# Patient Record
Sex: FEMALE | Race: WHITE | NOT HISPANIC OR LATINO | URBAN - METROPOLITAN AREA
[De-identification: names, ages, dates, MRNs, and addresses within clinical notes are randomized per-mention and may not be internally consistent; named-entity substitution may affect disease eponyms.]

---

## 2019-06-07 PROBLEM — 235595009 GASTROESOPHAGEAL REFLUX DISEASE: Status: ACTIVE | Noted: 2019-06-07

## 2019-06-07 PROBLEM — 38341003 HYPERTENSION: Status: ACTIVE | Noted: 2019-06-07

## 2020-06-23 ENCOUNTER — OFFICE VISIT (OUTPATIENT)
Dept: URBAN - METROPOLITAN AREA TELEHEALTH 2 | Facility: TELEHEALTH | Age: 75
End: 2020-06-23
Payer: MEDICARE

## 2020-06-23 DIAGNOSIS — Z12.11 COLON CANCER SCREENING: ICD-10-CM

## 2020-06-23 DIAGNOSIS — K21.9 GERD (GASTROESOPHAGEAL REFLUX DISEASE): ICD-10-CM

## 2020-06-23 PROCEDURE — 1036F TOBACCO NON-USER: CPT | Performed by: NURSE PRACTITIONER

## 2020-06-23 PROCEDURE — 3017F COLORECTAL CA SCREEN DOC REV: CPT | Performed by: NURSE PRACTITIONER

## 2020-06-23 PROCEDURE — G9903 PT SCRN TBCO ID AS NON USER: HCPCS | Performed by: NURSE PRACTITIONER

## 2020-06-23 PROCEDURE — G8427 DOCREV CUR MEDS BY ELIG CLIN: HCPCS | Performed by: NURSE PRACTITIONER

## 2020-06-23 PROCEDURE — 99213 OFFICE O/P EST LOW 20 MIN: CPT | Performed by: NURSE PRACTITIONER

## 2020-06-23 RX ORDER — LOSARTAN POTASSIUM 50 MG/1
TAKE 1 TABLET (50 MG) BY ORAL ROUTE ONCE DAILY TABLET ORAL 1
Qty: 0 | Refills: 0 | Status: ACTIVE | COMMUNITY
Start: 1900-01-01

## 2020-06-23 RX ORDER — HYOSCYAMINE SULFATE 0.12 MG/1
PLACE 1 TABLET UNDER TONGUE BY TRANSLINGUAL ROUTE EVERY 4 TO 6 HOURS AS NEEDED FOR 30 DAYS SPASM/ABDOMINAL PAIN TABLET, ORALLY DISINTEGRATING ORAL
Qty: 60 | Refills: 11 | Status: ACTIVE | COMMUNITY
Start: 2020-02-25 | End: 2021-02-19

## 2020-06-23 RX ORDER — PANTOPRAZOLE SODIUM 40 MG
TAKE 1 TABLET (40 MG) BY ORAL ROUTE 2 TIMES PER DAY FOR 90 DAYS TABLET, DELAYED RELEASE (ENTERIC COATED) ORAL 2
Qty: 180 | Refills: 3 | Status: ACTIVE | COMMUNITY
Start: 2020-02-25 | End: 2021-02-19

## 2020-06-23 RX ORDER — OMEPRAZOLE 40 MG/1
1 CAPSULE 30 MINUTES BEFORE MORNING MEAL CAPSULE, DELAYED RELEASE ORAL ONCE A DAY
Qty: 90 | Refills: 3 | OUTPATIENT
Start: 2020-06-23

## 2020-06-23 RX ORDER — PANTOPRAZOLE SODIUM 40 MG
TAKE 1 TABLET (40 MG) BY ORAL ROUTE 2 TIMES PER DAY FOR 90 DAYS TABLET, DELAYED RELEASE (ENTERIC COATED) ORAL 2
OUTPATIENT
Start: 2020-02-25 | End: 2021-02-19

## 2020-06-23 NOTE — HPI-OTHER HISTORIES
7/2/2019 Ms. Kayce Moffett is a 74 year old female here for reflux. She has had reflux for years. It has gotten worse over the last 18 months. She thinks she may have had an ulcer at one time. She has never had an EGD. She has tried omerpazole with no change- she can not remember how long she took it. She had been taking zantac 150mg but she stopped this because she did not feel it helped. She is currently taking Mint Rolaids. These help the most. Her symptoms are worse when she lays down at night. She denies any blood in her stool or melena. She has had a loss of appetite. CS  9/24 Kayce returns after EGD that showed hiatal hernia and moderate esophagitis. She currently is on  only rolaids. she did not take my advice to be on PPI. She is still having gerd and nighttime heartburn. She is not pleased with her progresso on rolaids.  11/19/2019 Ms. Devries is here for f.u of esophagitis. She was started on protonix 40mg BID at her last OV. Today, she is feeling great. Her reflux is now well controlled. She has woke a few times with acid in her throat. She has been taking the protonix at bedtime not with a meal. CS   2/25/2020 Ms. Moffett is here for f/u of GERD. She was decreased from BID protonix to daily. She denies any breakthrough and her reflux she feels is well controlled. She is however having some chest pain and a rash. She is seeing cardiology and dermatology soon. She is very concerned about her symptoms. CS

## 2020-06-23 NOTE — HPI-TODAY'S VISIT:
Ms. Moffett is evaluated via telehealth for f/u of GERD. At her last OV, she was feeling better and was able to wean to daily protonix. She was having some chest pain and a rash and was seeing cardiology and derm. We were concerned the chest pain was coming from reflux and spasm. Her protonix was increased back to BID and levsin was added prn. She saw cardiology and it was felt her chest pain was reflux related. She saw derm and there was a concern that protonix was the cause. She started taking the protonix everyother day and the rash got slightly better. She stopped it for the last week and has noticed a big improvement in her rash. She is having some reflux- rolaids do help. She had a cologuard that was negative with her PCP recently. AGUSTÍN

## 2020-07-21 ENCOUNTER — OFFICE VISIT (OUTPATIENT)
Dept: URBAN - METROPOLITAN AREA TELEHEALTH 2 | Facility: TELEHEALTH | Age: 75
End: 2020-07-21
Payer: MEDICARE

## 2020-07-21 DIAGNOSIS — K21.9 GERD (GASTROESOPHAGEAL REFLUX DISEASE): ICD-10-CM

## 2020-07-21 DIAGNOSIS — Z12.11 COLON CANCER SCREENING: ICD-10-CM

## 2020-07-21 PROCEDURE — 99441 PHONE E/M BY PHYS 5-10 MIN: CPT | Performed by: NURSE PRACTITIONER

## 2020-07-21 PROCEDURE — 99442 PHONE E/M BY PHYS 11-20 MIN: CPT | Performed by: NURSE PRACTITIONER

## 2020-07-21 RX ORDER — OMEPRAZOLE 40 MG/1
1 CAPSULE 30 MINUTES BEFORE MORNING MEAL CAPSULE, DELAYED RELEASE ORAL ONCE A DAY
Qty: 90 | Refills: 3 | Status: ON HOLD | COMMUNITY
Start: 2020-06-23

## 2020-07-21 RX ORDER — HYOSCYAMINE SULFATE 0.12 MG/1
PLACE 1 TABLET UNDER TONGUE BY TRANSLINGUAL ROUTE EVERY 4 TO 6 HOURS AS NEEDED FOR 30 DAYS SPASM/ABDOMINAL PAIN TABLET, ORALLY DISINTEGRATING ORAL
Qty: 60 | Refills: 11 | Status: ACTIVE | COMMUNITY
Start: 2020-02-25 | End: 2021-02-19

## 2020-07-21 RX ORDER — FAMOTIDINE 40 MG/1
1 TABLET 30MINS PRIOR TO MEALS TABLET, FILM COATED ORAL BID
Qty: 180 TABLET | Refills: 3 | OUTPATIENT
Start: 2020-07-21

## 2020-07-21 RX ORDER — LOSARTAN POTASSIUM 50 MG/1
TAKE 1 TABLET (50 MG) BY ORAL ROUTE ONCE DAILY TABLET ORAL 1
Qty: 0 | Refills: 0 | Status: ACTIVE | COMMUNITY
Start: 1900-01-01

## 2020-07-21 NOTE — HPI-OTHER HISTORIES
7/2/2019 Ms. Kayce Moffett is a 74 year old female here for reflux. She has had reflux for years. It has gotten worse over the last 18 months. She thinks she may have had an ulcer at one time. She has never had an EGD. She has tried omerpazole with no change- she can not remember how long she took it. She had been taking zantac 150mg but she stopped this because she did not feel it helped. She is currently taking Mint Rolaids. These help the most. Her symptoms are worse when she lays down at night. She denies any blood in her stool or melena. She has had a loss of appetite. CS  9/24 Kayce returns after EGD that showed hiatal hernia and moderate esophagitis. She currently is on  only rolaids. she did not take my advice to be on PPI. She is still having gerd and nighttime heartburn. She is not pleased with her progresso on rolaids.  11/19/2019 Ms. Devries is here for f.u of esophagitis. She was started on protonix 40mg BID at her last OV. Today, she is feeling great. Her reflux is now well controlled. She has woke a few times with acid in her throat. She has been taking the protonix at bedtime not with a meal. CS   2/25/2020 Ms. Moffett is here for f/u of GERD. She was decreased from BID protonix to daily. She denies any breakthrough and her reflux she feels is well controlled. She is however having some chest pain and a rash. She is seeing cardiology and dermatology soon. She is very concerned about her symptoms. CS   6/23/2020 Ms. Moffett is evaluated via telehealth for f/u of GERD. At her last OV, she was feeling better and was able to wean to daily protonix. She was having some chest pain and a rash and was seeing cardiology and derm. We were concerned the chest pain was coming from reflux and spasm. Her protonix was increased back to BID and levsin was added prn. She saw cardiology and it was felt her chest pain was reflux related. She saw derm and there was a concern that protonix was the cause. She started taking the protonix everyother day and the rash got slightly better. She stopped it for the last week and has noticed a big improvement in her rash. She is having some reflux- rolaids do help. She had a cologuard that was negative with her PCP recently. CS

## 2020-07-21 NOTE — HPI-TODAY'S VISIT:
Ms. Moffett is evaluated via telehealth for f/u of GERD. At her last OV, she was having a rash she thought was related to the protonix. Her protonix was stopped and changed to omerpazole 40mg daily. Her rash got worse. She has stopped the omerpazole as well. She called her PCP and was told omeprazole and protonix were on her allergy list- we did not have this documented nor did her pharmacy.  Her reflux is not well controlled. Rolaids help. She denies any further chest pain. She has not needed the levsin. CS

## 2020-09-02 ENCOUNTER — OFFICE VISIT (OUTPATIENT)
Dept: URBAN - NONMETROPOLITAN AREA CLINIC 2 | Facility: CLINIC | Age: 75
End: 2020-09-02
Payer: MEDICARE

## 2020-09-02 DIAGNOSIS — Z12.11 COLON CANCER SCREENING: ICD-10-CM

## 2020-09-02 DIAGNOSIS — K21.9 GERD (GASTROESOPHAGEAL REFLUX DISEASE): ICD-10-CM

## 2020-09-02 PROCEDURE — 99213 OFFICE O/P EST LOW 20 MIN: CPT | Performed by: NURSE PRACTITIONER

## 2020-09-02 PROCEDURE — G8420 CALC BMI NORM PARAMETERS: HCPCS | Performed by: NURSE PRACTITIONER

## 2020-09-02 PROCEDURE — 3017F COLORECTAL CA SCREEN DOC REV: CPT | Performed by: NURSE PRACTITIONER

## 2020-09-02 PROCEDURE — G8427 DOCREV CUR MEDS BY ELIG CLIN: HCPCS | Performed by: NURSE PRACTITIONER

## 2020-09-02 RX ORDER — LOSARTAN POTASSIUM 50 MG/1
TAKE 1 TABLET (50 MG) BY ORAL ROUTE ONCE DAILY TABLET ORAL 1
Qty: 0 | Refills: 0 | Status: ACTIVE | COMMUNITY
Start: 1900-01-01

## 2020-09-02 RX ORDER — HYOSCYAMINE SULFATE 0.12 MG/1
PLACE 1 TABLET UNDER TONGUE BY TRANSLINGUAL ROUTE EVERY 4 TO 6 HOURS AS NEEDED FOR 30 DAYS SPASM/ABDOMINAL PAIN TABLET, ORALLY DISINTEGRATING ORAL
Qty: 60 | Refills: 11 | Status: ACTIVE | COMMUNITY
Start: 2020-02-25 | End: 2021-02-19

## 2020-09-02 RX ORDER — FAMOTIDINE 40 MG/1
1 TABLET 30MINS PRIOR TO MEALS TABLET, FILM COATED ORAL BID
Qty: 180 TABLET | Refills: 3 | Status: ACTIVE | COMMUNITY
Start: 2020-07-21

## 2020-09-02 RX ORDER — FAMOTIDINE 40 MG/1
1 TABLET 30MINS PRIOR TO MEALS TABLET, FILM COATED ORAL BID
Qty: 180 TABLET | Refills: 3 | OUTPATIENT

## 2020-09-02 RX ORDER — OMEPRAZOLE 40 MG/1
1 CAPSULE 30 MINUTES BEFORE MORNING MEAL CAPSULE, DELAYED RELEASE ORAL ONCE A DAY
Qty: 90 | Refills: 3 | Status: ON HOLD | COMMUNITY
Start: 2020-06-23

## 2020-09-02 NOTE — HPI-TODAY'S VISIT:
Ms. Moffett is here for f/u of GERD. She has stopped all PPI and her rash is better. She is taking pepcid 20mg in the evening and rolaids as needed. Her reflux is well controlled. She has started eating smaller portions and low fat foods. Overall, she is feeling well today. CS

## 2020-09-02 NOTE — HPI-OTHER HISTORIES
7/2/2019 Ms. Kayce Moffett is a 74 year old female here for reflux. She has had reflux for years. It has gotten worse over the last 18 months. She thinks she may have had an ulcer at one time. She has never had an EGD. She has tried omerpazole with no change- she can not remember how long she took it. She had been taking zantac 150mg but she stopped this because she did not feel it helped. She is currently taking Mint Rolaids. These help the most. Her symptoms are worse when she lays down at night. She denies any blood in her stool or melena. She has had a loss of appetite. CS  9/24 Kayce returns after EGD that showed hiatal hernia and moderate esophagitis. She currently is on  only rolaids. she did not take my advice to be on PPI. She is still having gerd and nighttime heartburn. She is not pleased with her progresso on rolaids.  11/19/2019 Ms. Devries is here for f.u of esophagitis. She was started on protonix 40mg BID at her last OV. Today, she is feeling great. Her reflux is now well controlled. She has woke a few times with acid in her throat. She has been taking the protonix at bedtime not with a meal. CS   2/25/2020 Ms. Moffett is here for f/u of GERD. She was decreased from BID protonix to daily. She denies any breakthrough and her reflux she feels is well controlled. She is however having some chest pain and a rash. She is seeing cardiology and dermatology soon. She is very concerned about her symptoms. CS   6/23/2020 Ms. Moffett is evaluated via telehealth for f/u of GERD. At her last OV, she was feeling better and was able to wean to daily protonix. She was having some chest pain and a rash and was seeing cardiology and derm. We were concerned the chest pain was coming from reflux and spasm. Her protonix was increased back to BID and levsin was added prn. She saw cardiology and it was felt her chest pain was reflux related. She saw derm and there was a concern that protonix was the cause. She started taking the protonix everyother day and the rash got slightly better. She stopped it for the last week and has noticed a big improvement in her rash. She is having some reflux- rolaids do help. She had a cologuard that was negative with her PCP recently. CS   7/21/2020 Ms. Moffett is evaluated via telehealth for f/u of GERD. At her last OV, she was having a rash she thought was related to the protonix. Her protonix was stopped and changed to omerpazole 40mg daily. Her rash got worse. She has stopped the omerpazole as well. She called her PCP and was told omeprazole and protonix were on her allergy list- we did not have this documented nor did her pharmacy.  Her reflux is not well controlled. Rolaids help. She denies any further chest pain. She has not needed the levsin. CS

## 2021-03-03 ENCOUNTER — OFFICE VISIT (OUTPATIENT)
Dept: URBAN - NONMETROPOLITAN AREA CLINIC 2 | Facility: CLINIC | Age: 76
End: 2021-03-03
Payer: MEDICARE

## 2021-03-03 VITALS
TEMPERATURE: 96.8 F | BODY MASS INDEX: 28.14 KG/M2 | WEIGHT: 158.8 LBS | DIASTOLIC BLOOD PRESSURE: 96 MMHG | HEART RATE: 105 BPM | HEIGHT: 63 IN | SYSTOLIC BLOOD PRESSURE: 126 MMHG

## 2021-03-03 DIAGNOSIS — K21.9 GERD (GASTROESOPHAGEAL REFLUX DISEASE): ICD-10-CM

## 2021-03-03 DIAGNOSIS — Z12.11 COLON CANCER SCREENING: ICD-10-CM

## 2021-03-03 PROCEDURE — 99213 OFFICE O/P EST LOW 20 MIN: CPT | Performed by: NURSE PRACTITIONER

## 2021-03-03 RX ORDER — OMEPRAZOLE 40 MG/1
1 CAPSULE 30 MINUTES BEFORE MORNING MEAL CAPSULE, DELAYED RELEASE ORAL ONCE A DAY
Qty: 90 | Refills: 3 | Status: ON HOLD | COMMUNITY
Start: 2020-06-23

## 2021-03-03 RX ORDER — SPIRONOLACTONE 50 MG/1
1 TABLET TABLET, FILM COATED ORAL ONCE A DAY
Status: ACTIVE | COMMUNITY

## 2021-03-03 RX ORDER — LOSARTAN POTASSIUM 50 MG/1
TAKE 1 TABLET (50 MG) BY ORAL ROUTE ONCE DAILY TABLET ORAL 1
Qty: 0 | Refills: 0 | Status: ON HOLD | COMMUNITY
Start: 1900-01-01

## 2021-03-03 RX ORDER — FAMOTIDINE 40 MG/1
1 TABLET 30MINS PRIOR TO MEALS TABLET, FILM COATED ORAL BID
Qty: 180 TABLET | Refills: 3 | Status: ACTIVE | COMMUNITY

## 2021-03-03 RX ORDER — FAMOTIDINE 40 MG/1
1 TABLET 30MINS PRIOR TO MEALS TABLET, FILM COATED ORAL BID
Qty: 180 TABLET | Refills: 3 | OUTPATIENT

## 2021-03-03 NOTE — HPI-OTHER HISTORIES
7/2/2019 Ms. Kayce Moffett is a 74 year old female here for reflux. She has had reflux for years. It has gotten worse over the last 18 months. She thinks she may have had an ulcer at one time. She has never had an EGD. She has tried omerpazole with no change- she can not remember how long she took it. She had been taking zantac 150mg but she stopped this because she did not feel it helped. She is currently taking Mint Rolaids. These help the most. Her symptoms are worse when she lays down at night. She denies any blood in her stool or melena. She has had a loss of appetite. CS  9/24 Kayce returns after EGD that showed hiatal hernia and moderate esophagitis. She currently is on  only rolaids. she did not take my advice to be on PPI. She is still having gerd and nighttime heartburn. She is not pleased with her progresso on rolaids.  11/19/2019 Ms. Devries is here for f.u of esophagitis. She was started on protonix 40mg BID at her last OV. Today, she is feeling great. Her reflux is now well controlled. She has woke a few times with acid in her throat. She has been taking the protonix at bedtime not with a meal. CS   2/25/2020 Ms. Moffett is here for f/u of GERD. She was decreased from BID protonix to daily. She denies any breakthrough and her reflux she feels is well controlled. She is however having some chest pain and a rash. She is seeing cardiology and dermatology soon. She is very concerned about her symptoms. CS   6/23/2020 Ms. Moffett is evaluated via telehealth for f/u of GERD. At her last OV, she was feeling better and was able to wean to daily protonix. She was having some chest pain and a rash and was seeing cardiology and derm. We were concerned the chest pain was coming from reflux and spasm. Her protonix was increased back to BID and levsin was added prn. She saw cardiology and it was felt her chest pain was reflux related. She saw derm and there was a concern that protonix was the cause. She started taking the protonix everyother day and the rash got slightly better. She stopped it for the last week and has noticed a big improvement in her rash. She is having some reflux- rolaids do help. She had a cologuard that was negative with her PCP recently. CS   7/21/2020 Ms. Moffett is evaluated via telehealth for f/u of GERD. At her last OV, she was having a rash she thought was related to the protonix. Her protonix was stopped and changed to omerpazole 40mg daily. Her rash got worse. She has stopped the omerpazole as well. She called her PCP and was told omeprazole and protonix were on her allergy list- we did not have this documented nor did her pharmacy.  Her reflux is not well controlled. Rolaids help. She denies any further chest pain. She has not needed the levsin. CS   9/2/2020 Ms. Moffett is here for f/u of GERD. She has stopped all PPI and her rash is better. She is taking pepcid 20mg in the evening and rolaids as needed. Her reflux is well controlled. She has started eating smaller portions and low fat foods. Overall, she is feeling well today. CS

## 2021-03-03 NOTE — HPI-TODAY'S VISIT:
3/3/2021 Ms. Moffett is here for f/u of GERD. She stopped all PPI and her rash got better. Since changing her diet, her reflux is fairly well controlled. She is was given 40mg of pepcid 1-2 times a day. She developed facial hair and wondered if the pepcid caused this. She has stopped this and now on aldactone 50mg daily. She is taking OTC pepcid 20mg and this works well.  She had some chest fluttering coming to her appointment today. She was anxious and running late. She has not had a recent EKG. Her parents had heart disease. CS

## 2021-08-28 ENCOUNTER — TELEPHONE ENCOUNTER (OUTPATIENT)
Dept: URBAN - METROPOLITAN AREA CLINIC 13 | Facility: CLINIC | Age: 76
End: 2021-08-28

## 2021-08-28 RX ORDER — OMEPRAZOLE 40 MG/1
CAPSULE, DELAYED RELEASE ORAL
OUTPATIENT
End: 2019-06-07

## 2021-08-29 ENCOUNTER — TELEPHONE ENCOUNTER (OUTPATIENT)
Dept: URBAN - METROPOLITAN AREA CLINIC 13 | Facility: CLINIC | Age: 76
End: 2021-08-29

## 2021-08-29 RX ORDER — LOSARTAN POTASSIUM 50 MG/1
TABLET, FILM COATED ORAL
Status: ACTIVE | COMMUNITY

## 2021-08-29 RX ORDER — IBUPROFEN 200 MG/1
CAPSULE, LIQUID FILLED ORAL
Status: ACTIVE | COMMUNITY

## 2021-09-01 ENCOUNTER — WEB ENCOUNTER (OUTPATIENT)
Dept: URBAN - NONMETROPOLITAN AREA CLINIC 2 | Facility: CLINIC | Age: 76
End: 2021-09-01

## 2021-09-01 ENCOUNTER — OFFICE VISIT (OUTPATIENT)
Dept: URBAN - NONMETROPOLITAN AREA CLINIC 2 | Facility: CLINIC | Age: 76
End: 2021-09-01
Payer: MEDICARE

## 2021-09-01 VITALS
HEART RATE: 78 BPM | WEIGHT: 165.8 LBS | HEIGHT: 63 IN | BODY MASS INDEX: 29.38 KG/M2 | TEMPERATURE: 96.9 F | SYSTOLIC BLOOD PRESSURE: 142 MMHG | DIASTOLIC BLOOD PRESSURE: 94 MMHG

## 2021-09-01 DIAGNOSIS — K21.9 GERD (GASTROESOPHAGEAL REFLUX DISEASE): ICD-10-CM

## 2021-09-01 DIAGNOSIS — Z12.11 COLON CANCER SCREENING: ICD-10-CM

## 2021-09-01 PROCEDURE — 99213 OFFICE O/P EST LOW 20 MIN: CPT | Performed by: NURSE PRACTITIONER

## 2021-09-01 RX ORDER — FAMOTIDINE 40 MG/1
1 TABLET 30MINS PRIOR TO MEALS TABLET, FILM COATED ORAL BID
Qty: 180 TABLET | Refills: 3 | OUTPATIENT

## 2021-09-01 RX ORDER — FAMOTIDINE 40 MG/1
1 TABLET 30MINS PRIOR TO MEALS TABLET, FILM COATED ORAL BID
Qty: 180 TABLET | Refills: 3 | Status: ACTIVE | COMMUNITY

## 2021-09-01 RX ORDER — LOSARTAN POTASSIUM 50 MG/1
TABLET, FILM COATED ORAL
Status: ACTIVE | COMMUNITY

## 2021-09-01 RX ORDER — IBUPROFEN 200 MG/1
CAPSULE, LIQUID FILLED ORAL
Status: ACTIVE | COMMUNITY

## 2021-09-01 RX ORDER — OMEPRAZOLE 40 MG/1
1 CAPSULE 30 MINUTES BEFORE MORNING MEAL CAPSULE, DELAYED RELEASE ORAL ONCE A DAY
Qty: 90 | Refills: 3 | Status: ON HOLD | COMMUNITY
Start: 2020-06-23

## 2021-09-01 RX ORDER — SPIRONOLACTONE 50 MG/1
1 TABLET TABLET, FILM COATED ORAL ONCE A DAY
Status: ACTIVE | COMMUNITY

## 2021-09-01 NOTE — HPI-OTHER HISTORIES
7/2/2019 Ms. Kayce Moffett is a 74 year old female here for reflux. She has had reflux for years. It has gotten worse over the last 18 months. She thinks she may have had an ulcer at one time. She has never had an EGD. She has tried omerpazole with no change- she can not remember how long she took it. She had been taking zantac 150mg but she stopped this because she did not feel it helped. She is currently taking Mint Rolaids. These help the most. Her symptoms are worse when she lays down at night. She denies any blood in her stool or melena. She has had a loss of appetite. CS  9/24 Kayce returns after EGD that showed hiatal hernia and moderate esophagitis. She currently is on  only rolaids. she did not take my advice to be on PPI. She is still having gerd and nighttime heartburn. She is not pleased with her progresso on rolaids.  11/19/2019 Ms. Devries is here for f.u of esophagitis. She was started on protonix 40mg BID at her last OV. Today, she is feeling great. Her reflux is now well controlled. She has woke a few times with acid in her throat. She has been taking the protonix at bedtime not with a meal. CS   2/25/2020 Ms. Moffett is here for f/u of GERD. She was decreased from BID protonix to daily. She denies any breakthrough and her reflux she feels is well controlled. She is however having some chest pain and a rash. She is seeing cardiology and dermatology soon. She is very concerned about her symptoms. CS   6/23/2020 Ms. Moffett is evaluated via telehealth for f/u of GERD. At her last OV, she was feeling better and was able to wean to daily protonix. She was having some chest pain and a rash and was seeing cardiology and derm. We were concerned the chest pain was coming from reflux and spasm. Her protonix was increased back to BID and levsin was added prn. She saw cardiology and it was felt her chest pain was reflux related. She saw derm and there was a concern that protonix was the cause. She started taking the protonix everyother day and the rash got slightly better. She stopped it for the last week and has noticed a big improvement in her rash. She is having some reflux- rolaids do help. She had a cologuard that was negative with her PCP recently. CS   7/21/2020 Ms. Moffett is evaluated via telehealth for f/u of GERD. At her last OV, she was having a rash she thought was related to the protonix. Her protonix was stopped and changed to omerpazole 40mg daily. Her rash got worse. She has stopped the omerpazole as well. She called her PCP and was told omeprazole and protonix were on her allergy list- we did not have this documented nor did her pharmacy.  Her reflux is not well controlled. Rolaids help. She denies any further chest pain. She has not needed the levsin. CS   9/2/2020 Ms. Moffett is here for f/u of GERD. She has stopped all PPI and her rash is better. She is taking pepcid 20mg in the evening and rolaids as needed. Her reflux is well controlled. She has started eating smaller portions and low fat foods. Overall, she is feeling well today. CS   3/3/2021 Ms. Moffett is here for f/u of GERD. She stopped all PPI and her rash got better. Since changing her diet, her reflux is fairly well controlled. She is was given 40mg of pepcid 1-2 times a day. She developed facial hair and wondered if the pepcid caused this. She has stopped this and now on aldactone 50mg daily. She is taking OTC pepcid 20mg and this works well.  She had some chest fluttering coming to her appointment today. She was anxious and running late. She has not had a recent EKG. Her parents had heart disease. CS

## 2021-09-01 NOTE — HPI-TODAY'S VISIT:
9/1/2021 Ms. Moffett is here for f/u of GERD. She had a rash with PPI. Her reflux was well controlled at her last OV with pepcid 20mg daily. Today, she reports having a return of reflux a few months ago. She saw Dr Haile and given BID pepcid. This messed with her head so she stopped it. She called Dr Haile back and said due to her allergies there were not many options and advised to return to previous pepcid dose. Her  researched and found that baking soda in 8 oz of water can help with reflux. Since starting this at 3pm, her reflux has been well controlled. She will have breakthrough related to food and this is covered by pepcid or rolaids. Overall, she is feeling well and has no complaints today. CS

## 2022-03-15 ENCOUNTER — OFFICE VISIT (OUTPATIENT)
Dept: URBAN - NONMETROPOLITAN AREA CLINIC 13 | Facility: CLINIC | Age: 77
End: 2022-03-15
Payer: MEDICARE

## 2022-03-15 VITALS
SYSTOLIC BLOOD PRESSURE: 120 MMHG | BODY MASS INDEX: 29.77 KG/M2 | DIASTOLIC BLOOD PRESSURE: 78 MMHG | WEIGHT: 168 LBS | HEIGHT: 63 IN | HEART RATE: 94 BPM

## 2022-03-15 DIAGNOSIS — Z12.11 COLON CANCER SCREENING: ICD-10-CM

## 2022-03-15 DIAGNOSIS — K44.9 HIATAL HERNIA: ICD-10-CM

## 2022-03-15 DIAGNOSIS — K21.9 GERD (GASTROESOPHAGEAL REFLUX DISEASE): ICD-10-CM

## 2022-03-15 PROBLEM — 305058001: Status: ACTIVE | Noted: 2020-07-21

## 2022-03-15 PROCEDURE — 99214 OFFICE O/P EST MOD 30 MIN: CPT | Performed by: NURSE PRACTITIONER

## 2022-03-15 RX ORDER — SPIRONOLACTONE 50 MG/1
1 TABLET TABLET, FILM COATED ORAL ONCE A DAY
COMMUNITY

## 2022-03-15 RX ORDER — LOSARTAN POTASSIUM 50 MG/1
TABLET, FILM COATED ORAL
COMMUNITY

## 2022-03-15 RX ORDER — FAMOTIDINE 40 MG/1
1 TABLET 30MINS PRIOR TO MEALS TABLET, FILM COATED ORAL BID
Qty: 180 TABLET | Refills: 3 | COMMUNITY

## 2022-03-15 RX ORDER — SUCRALFATE 1 G
1 TABLET 1HR PRIOR TO MEALS AND AT BEDTIME TABLET ORAL QID
Qty: 120 TABLET | Refills: 2 | OUTPATIENT
Start: 2022-03-15 | End: 2022-06-13

## 2022-03-15 RX ORDER — OMEPRAZOLE 40 MG/1
1 CAPSULE 30 MINUTES BEFORE MORNING MEAL CAPSULE, DELAYED RELEASE ORAL ONCE A DAY
Qty: 90 | Refills: 3 | COMMUNITY
Start: 2020-06-23

## 2022-03-15 RX ORDER — FAMOTIDINE 40 MG/1
1 TABLET 30MINS PRIOR TO MEALS TABLET, FILM COATED ORAL BID
Qty: 180 TABLET | Refills: 3 | OUTPATIENT

## 2022-03-15 RX ORDER — IBUPROFEN 200 MG/1
CAPSULE, LIQUID FILLED ORAL
COMMUNITY

## 2022-03-15 NOTE — HPI-OTHER HISTORIES
7/2/2019 Ms. Kayce Moffett is a 74 year old female here for reflux. She has had reflux for years. It has gotten worse over the last 18 months. She thinks she may have had an ulcer at one time. She has never had an EGD. She has tried omerpazole with no change- she can not remember how long she took it. She had been taking zantac 150mg but she stopped this because she did not feel it helped. She is currently taking Mint Rolaids. These help the most. Her symptoms are worse when she lays down at night. She denies any blood in her stool or melena. She has had a loss of appetite. CS  9/24 Kayce returns after EGD that showed hiatal hernia and moderate esophagitis. She currently is on  only rolaids. she did not take my advice to be on PPI. She is still having gerd and nighttime heartburn. She is not pleased with her progresso on rolaids.  11/19/2019 Ms. Devries is here for f.u of esophagitis. She was started on protonix 40mg BID at her last OV. Today, she is feeling great. Her reflux is now well controlled. She has woke a few times with acid in her throat. She has been taking the protonix at bedtime not with a meal. CS   2/25/2020 Ms. Moffett is here for f/u of GERD. She was decreased from BID protonix to daily. She denies any breakthrough and her reflux she feels is well controlled. She is however having some chest pain and a rash. She is seeing cardiology and dermatology soon. She is very concerned about her symptoms. CS   6/23/2020 Ms. Moffett is evaluated via telehealth for f/u of GERD. At her last OV, she was feeling better and was able to wean to daily protonix. She was having some chest pain and a rash and was seeing cardiology and derm. We were concerned the chest pain was coming from reflux and spasm. Her protonix was increased back to BID and levsin was added prn. She saw cardiology and it was felt her chest pain was reflux related. She saw derm and there was a concern that protonix was the cause. She started taking the protonix everyother day and the rash got slightly better. She stopped it for the last week and has noticed a big improvement in her rash. She is having some reflux- rolaids do help. She had a cologuard that was negative with her PCP recently. CS   7/21/2020 Ms. Moffett is evaluated via telehealth for f/u of GERD. At her last OV, she was having a rash she thought was related to the protonix. Her protonix was stopped and changed to omerpazole 40mg daily. Her rash got worse. She has stopped the omerpazole as well. She called her PCP and was told omeprazole and protonix were on her allergy list- we did not have this documented nor did her pharmacy.  Her reflux is not well controlled. Rolaids help. She denies any further chest pain. She has not needed the levsin. CS   9/2/2020 Ms. Moffett is here for f/u of GERD. She has stopped all PPI and her rash is better. She is taking pepcid 20mg in the evening and rolaids as needed. Her reflux is well controlled. She has started eating smaller portions and low fat foods. Overall, she is feeling well today. CS   3/3/2021 Ms. Mofeftt is here for f/u of GERD. She stopped all PPI and her rash got better. Since changing her diet, her reflux is fairly well controlled. She is was given 40mg of pepcid 1-2 times a day. She developed facial hair and wondered if the pepcid caused this. She has stopped this and now on aldactone 50mg daily. She is taking OTC pepcid 20mg and this works well.  She had some chest fluttering coming to her appointment today. She was anxious and running late. She has not had a recent EKG. Her parents had heart disease. CS

## 2022-03-15 NOTE — HPI-TODAY'S VISIT:
3/15/2022 Ms. Moffett is here for f/u of GERD. She had a rash with PPIs and pepcid messes with her head. She had been taking baking soda for her reflux this was working until the day after [place]. She started having pain mid epigastric to her LUQ. She went to urgent care. There was a concern about a kidney stone so she had a CT scan. This showed a small hiatal hernia. She would like this fixed. She had this hiatal hernia on EGD in 2019. She has been trying to watch her diet. Her PCP gave her prevacid which has given her a rash and diarrhea. It does help however. CS

## 2022-05-10 ENCOUNTER — OFFICE VISIT (OUTPATIENT)
Dept: URBAN - NONMETROPOLITAN AREA CLINIC 13 | Facility: CLINIC | Age: 77
End: 2022-05-10

## 2022-05-10 RX ORDER — SPIRONOLACTONE 50 MG/1
1 TABLET TABLET, FILM COATED ORAL ONCE A DAY
COMMUNITY

## 2022-05-10 RX ORDER — OMEPRAZOLE 40 MG/1
1 CAPSULE 30 MINUTES BEFORE MORNING MEAL CAPSULE, DELAYED RELEASE ORAL ONCE A DAY
Qty: 90 | Refills: 3 | COMMUNITY
Start: 2020-06-23

## 2022-05-10 RX ORDER — SUCRALFATE 1 G
1 TABLET 1HR PRIOR TO MEALS AND AT BEDTIME TABLET ORAL QID
Qty: 120 TABLET | Refills: 2 | COMMUNITY
Start: 2022-03-15 | End: 2022-06-13

## 2022-05-10 RX ORDER — IBUPROFEN 200 MG/1
CAPSULE, LIQUID FILLED ORAL
COMMUNITY

## 2022-05-10 RX ORDER — FAMOTIDINE 40 MG/1
1 TABLET 30MINS PRIOR TO MEALS TABLET, FILM COATED ORAL BID
Qty: 180 TABLET | Refills: 3 | COMMUNITY

## 2022-05-10 RX ORDER — LOSARTAN POTASSIUM 50 MG/1
TABLET, FILM COATED ORAL
COMMUNITY

## 2022-09-26 ENCOUNTER — TELEPHONE ENCOUNTER (OUTPATIENT)
Dept: URBAN - NONMETROPOLITAN AREA CLINIC 13 | Facility: CLINIC | Age: 77
End: 2022-09-26

## 2022-09-26 RX ORDER — SUCRALFATE 1 G
1 TABLET 1HR PRIOR TO MEALS AND AT BEDTIME TABLET ORAL QID
Qty: 120 TABLET | Refills: 2
Start: 2022-03-15 | End: 2022-12-26

## 2022-09-29 ENCOUNTER — TELEPHONE ENCOUNTER (OUTPATIENT)
Dept: URBAN - NONMETROPOLITAN AREA CLINIC 2 | Facility: CLINIC | Age: 77
End: 2022-09-29

## 2022-10-03 ENCOUNTER — OFFICE VISIT (OUTPATIENT)
Dept: URBAN - NONMETROPOLITAN AREA CLINIC 13 | Facility: CLINIC | Age: 77
End: 2022-10-03

## 2023-01-17 ENCOUNTER — OFFICE VISIT (OUTPATIENT)
Dept: URBAN - NONMETROPOLITAN AREA CLINIC 13 | Facility: CLINIC | Age: 78
End: 2023-01-17

## 2023-04-23 NOTE — PHYSICAL EXAM CHEST:
breathing is unlabored without accessory muscle use, normal breath sounds [Normal Weight - ( BMI  <25 )] : normal weight - ( BMI  <25 ) [Continue diet as tolerated] : continue diet as tolerated based on goals of care [Non - Smoker] : non-smoker [Smoke/CO Detectors] : smoke/CO detectors [Use assistive device to avoid falls] : use assistive device to avoid falls [___] : [unfilled] [] : foot exam [Not Recommended] : Aspirin use not recommended due to overall prognosis [Improve mobility] : improve mobility [Decrease stress] : decrease stress [Minimize unnecessary interventions] : minimize unnecessary interventions [Maintain functional ability] : maintain functional ability [Likely to achieve goals/desired outcomes] : likely to achieve goals/desired outcomes [Patient/Caregiver has ___ understanding of disease process] : patient/caregiver has [unfilled] understanding of disease process [Advanced Directives discussed: ____] : Advanced directives discussed: [unfilled] [Completed Medical Orders for Life-Sustaining Treatment] : completed medical orders for life-sustaining treatment [Full Code] : Code Status: Full Code [No Limitations] : Treatment Guidelines: No limitations [DNI] : Intubation: DNI [[facility] Date: _____] [facility]last [facility] date: [unfilled] [_____] : HCP: [unfilled] [Established patient, extensive review of history, medical and functional status, risk factors and patient education] : Established patient, extensive review of history, medical and functional status, risk factors and patient education and counseling provided for subsequent annual wellness visit [ - New patient with 2 or more chronic conditions; CCM discussed and patient-centered care plan established] : New patient with 2 or more chronic conditions; CCM discussed and patient-centered care plan established

## 2023-05-10 ENCOUNTER — OFFICE VISIT (OUTPATIENT)
Dept: URBAN - NONMETROPOLITAN AREA CLINIC 13 | Facility: CLINIC | Age: 78
End: 2023-05-10
Payer: MEDICARE

## 2023-05-10 VITALS
BODY MASS INDEX: 27.18 KG/M2 | DIASTOLIC BLOOD PRESSURE: 88 MMHG | SYSTOLIC BLOOD PRESSURE: 144 MMHG | WEIGHT: 153.4 LBS | HEART RATE: 70 BPM | HEIGHT: 63 IN

## 2023-05-10 DIAGNOSIS — K21.9 GERD (GASTROESOPHAGEAL REFLUX DISEASE): ICD-10-CM

## 2023-05-10 DIAGNOSIS — Z12.11 COLON CANCER SCREENING: ICD-10-CM

## 2023-05-10 PROCEDURE — 99213 OFFICE O/P EST LOW 20 MIN: CPT | Performed by: NURSE PRACTITIONER

## 2023-05-10 RX ORDER — OMEPRAZOLE 40 MG/1
1 CAPSULE 30 MINUTES BEFORE MORNING MEAL CAPSULE, DELAYED RELEASE ORAL ONCE A DAY
Qty: 90 | Refills: 3 | COMMUNITY
Start: 2020-06-23

## 2023-05-10 RX ORDER — FAMOTIDINE 40 MG/1
1 TABLET 30MINS PRIOR TO MEALS TABLET, FILM COATED ORAL BID
Qty: 180 TABLET | Refills: 3 | COMMUNITY

## 2023-05-10 RX ORDER — IBUPROFEN 200 MG/1
CAPSULE, LIQUID FILLED ORAL
COMMUNITY

## 2023-05-10 RX ORDER — LOSARTAN POTASSIUM 50 MG/1
TABLET, FILM COATED ORAL
COMMUNITY

## 2023-05-10 RX ORDER — SUCRALFATE 1 G/1
1 TABLET DISSOLVED IN A TABLESPOON OF WATER ON AN EMPTY STOMACH TABLET ORAL TWICE A DAY
Qty: 60 TABLET | Refills: 11 | OUTPATIENT
Start: 2023-05-10 | End: 2024-05-04

## 2023-05-10 RX ORDER — LANSOPRAZOLE 30 MG/1
1 CAPSULE BEFORE A MEAL CAPSULE, DELAYED RELEASE ORAL ONCE A DAY
Status: ON HOLD | COMMUNITY

## 2023-05-10 RX ORDER — SPIRONOLACTONE 50 MG/1
1 TABLET TABLET, FILM COATED ORAL ONCE A DAY
COMMUNITY

## 2023-05-10 NOTE — HPI-TODAY'S VISIT:
5/10/2023 Ms. Moffett is here for f/u of GERD. She had a rash with PPIs and pepcid messes with her head. Since her last OV, she had another flare of reflux and Dr Haile started her on prevacid. This caused vertigo. She is now back taking milk with baking soda prior to supper and rolaids prn. She can not recall if carafate helped. She is not taking it. She saw the nutritionist and watching her diet. She is not eating late. She has lost 15 pounds. Overall, she is doing ok. CS

## 2023-05-10 NOTE — HPI-OTHER HISTORIES
7/2/2019 Ms. Kayce Moffett is a 74 year old female here for reflux. She has had reflux for years. It has gotten worse over the last 18 months. She thinks she may have had an ulcer at one time. She has never had an EGD. She has tried omerpazole with no change- she can not remember how long she took it. She had been taking zantac 150mg but she stopped this because she did not feel it helped. She is currently taking Mint Rolaids. These help the most. Her symptoms are worse when she lays down at night. She denies any blood in her stool or melena. She has had a loss of appetite. CS  9/24 Kayce returns after EGD that showed hiatal hernia and moderate esophagitis. She currently is on  only rolaids. she did not take my advice to be on PPI. She is still having gerd and nighttime heartburn. She is not pleased with her progresso on rolaids.  11/19/2019 Ms. Devries is here for f.u of esophagitis. She was started on protonix 40mg BID at her last OV. Today, she is feeling great. Her reflux is now well controlled. She has woke a few times with acid in her throat. She has been taking the protonix at bedtime not with a meal. CS   2/25/2020 Ms. Moffett is here for f/u of GERD. She was decreased from BID protonix to daily. She denies any breakthrough and her reflux she feels is well controlled. She is however having some chest pain and a rash. She is seeing cardiology and dermatology soon. She is very concerned about her symptoms. CS   6/23/2020 Ms. Moffett is evaluated via telehealth for f/u of GERD. At her last OV, she was feeling better and was able to wean to daily protonix. She was having some chest pain and a rash and was seeing cardiology and derm. We were concerned the chest pain was coming from reflux and spasm. Her protonix was increased back to BID and levsin was added prn. She saw cardiology and it was felt her chest pain was reflux related. She saw derm and there was a concern that protonix was the cause. She started taking the protonix everyother day and the rash got slightly better. She stopped it for the last week and has noticed a big improvement in her rash. She is having some reflux- rolaids do help. She had a cologuard that was negative with her PCP recently. CS   7/21/2020 Ms. Moffett is evaluated via telehealth for f/u of GERD. At her last OV, she was having a rash she thought was related to the protonix. Her protonix was stopped and changed to omerpazole 40mg daily. Her rash got worse. She has stopped the omerpazole as well. She called her PCP and was told omeprazole and protonix were on her allergy list- we did not have this documented nor did her pharmacy.  Her reflux is not well controlled. Rolaids help. She denies any further chest pain. She has not needed the levsin. CS   9/2/2020 Ms. Moffett is here for f/u of GERD. She has stopped all PPI and her rash is better. She is taking pepcid 20mg in the evening and rolaids as needed. Her reflux is well controlled. She has started eating smaller portions and low fat foods. Overall, she is feeling well today. CS   3/3/2021 Ms. Moffett is here for f/u of GERD. She stopped all PPI and her rash got better. Since changing her diet, her reflux is fairly well controlled. She is was given 40mg of pepcid 1-2 times a day. She developed facial hair and wondered if the pepcid caused this. She has stopped this and now on aldactone 50mg daily. She is taking OTC pepcid 20mg and this works well.  She had some chest fluttering coming to her appointment today. She was anxious and running late. She has not had a recent EKG. Her parents had heart disease. CS  3/15/2022 Ms. Moffett is here for f/u of GERD. She had a rash with PPIs and pepcid messes with her head. She had been taking baking soda for her reflux this was working until the day after Millie. She started having pain mid epigastric to her LUQ. She went to urgent care. There was a concern about a kidney stone so she had a CT scan. This showed a small hiatal hernia. She would like this fixed. She had this hiatal hernia on EGD in 2019. She has been trying to watch her diet. Her PCP gave her prevacid which has given her a rash and diarrhea. It does help however. CS

## 2023-11-15 ENCOUNTER — OFFICE VISIT (OUTPATIENT)
Dept: URBAN - NONMETROPOLITAN AREA CLINIC 13 | Facility: CLINIC | Age: 78
End: 2023-11-15

## 2024-01-17 ENCOUNTER — OFFICE VISIT (OUTPATIENT)
Dept: URBAN - NONMETROPOLITAN AREA CLINIC 13 | Facility: CLINIC | Age: 79
End: 2024-01-17

## 2024-04-24 ENCOUNTER — OV EP (OUTPATIENT)
Dept: URBAN - NONMETROPOLITAN AREA CLINIC 13 | Facility: CLINIC | Age: 79
End: 2024-04-24

## 2024-05-01 ENCOUNTER — CLAIMS CREATED FROM THE CLAIM WINDOW (OUTPATIENT)
Dept: URBAN - NONMETROPOLITAN AREA CLINIC 13 | Facility: CLINIC | Age: 79
End: 2024-05-01

## 2024-05-01 ENCOUNTER — DASHBOARD ENCOUNTERS (OUTPATIENT)
Age: 79
End: 2024-05-01

## 2024-05-01 ENCOUNTER — OFFICE VISIT (OUTPATIENT)
Dept: URBAN - NONMETROPOLITAN AREA CLINIC 13 | Facility: CLINIC | Age: 79
End: 2024-05-01
Payer: MEDICARE

## 2024-05-01 ENCOUNTER — TELEPHONE ENCOUNTER (OUTPATIENT)
Dept: URBAN - NONMETROPOLITAN AREA CLINIC 2 | Facility: CLINIC | Age: 79
End: 2024-05-01

## 2024-05-01 VITALS
HEART RATE: 88 BPM | SYSTOLIC BLOOD PRESSURE: 145 MMHG | DIASTOLIC BLOOD PRESSURE: 111 MMHG | BODY MASS INDEX: 29.2 KG/M2 | HEIGHT: 63 IN | WEIGHT: 164.8 LBS

## 2024-05-01 DIAGNOSIS — Z12.11 COLON CANCER SCREENING: ICD-10-CM

## 2024-05-01 DIAGNOSIS — K21.9 GERD (GASTROESOPHAGEAL REFLUX DISEASE): ICD-10-CM

## 2024-05-01 DIAGNOSIS — K44.9 HIATAL HERNIA: ICD-10-CM

## 2024-05-01 PROCEDURE — 99213 OFFICE O/P EST LOW 20 MIN: CPT | Performed by: NURSE PRACTITIONER

## 2024-05-01 RX ORDER — SUCRALFATE 1 G/1
1 TABLET DISSOLVED IN A TABLESPOON OF WATER ON AN EMPTY STOMACH TABLET ORAL TWICE A DAY
Qty: 60 TABLET | Refills: 11 | OUTPATIENT

## 2024-05-01 RX ORDER — SUCRALFATE 1 G/1
1 TABLET DISSOLVED IN A TABLESPOON OF WATER ON AN EMPTY STOMACH TABLET ORAL TWICE A DAY
Qty: 60 TABLET | Refills: 11 | Status: ACTIVE | COMMUNITY
Start: 2023-05-10 | End: 2024-05-04

## 2024-05-01 RX ORDER — SPIRONOLACTONE 50 MG/1
1 TABLET TABLET, FILM COATED ORAL ONCE A DAY
COMMUNITY

## 2024-05-01 RX ORDER — FAMOTIDINE 40 MG/1
1 TABLET 30MINS PRIOR TO MEALS TABLET, FILM COATED ORAL BID
Qty: 180 TABLET | Refills: 3 | COMMUNITY

## 2024-05-01 RX ORDER — LANSOPRAZOLE 30 MG/1
1 CAPSULE BEFORE A MEAL CAPSULE, DELAYED RELEASE ORAL ONCE A DAY
Status: ON HOLD | COMMUNITY

## 2024-05-01 RX ORDER — VONOPRAZAN FUMARATE 26.72 MG/1
1 TABLET TABLET ORAL DAILY
Qty: 90 TABLET | Refills: 3 | OUTPATIENT
Start: 2024-05-01 | End: 2025-04-26

## 2024-05-01 RX ORDER — IBUPROFEN 200 MG/1
CAPSULE, LIQUID FILLED ORAL
COMMUNITY

## 2024-05-01 RX ORDER — LOSARTAN POTASSIUM 50 MG/1
TABLET, FILM COATED ORAL
COMMUNITY

## 2024-05-01 RX ORDER — OMEPRAZOLE 40 MG/1
1 CAPSULE 30 MINUTES BEFORE MORNING MEAL CAPSULE, DELAYED RELEASE ORAL ONCE A DAY
Qty: 90 | Refills: 3 | COMMUNITY
Start: 2020-06-23

## 2024-05-02 ENCOUNTER — TELEPHONE ENCOUNTER (OUTPATIENT)
Dept: URBAN - NONMETROPOLITAN AREA CLINIC 2 | Facility: CLINIC | Age: 79
End: 2024-05-02

## 2024-05-29 ENCOUNTER — TELEPHONE ENCOUNTER (OUTPATIENT)
Dept: URBAN - NONMETROPOLITAN AREA CLINIC 2 | Facility: CLINIC | Age: 79
End: 2024-05-29

## 2024-05-29 RX ORDER — VONOPRAZAN FUMARATE 26.72 MG/1
1 TABLET TABLET ORAL DAILY
Qty: 90 TABLET | Refills: 3
Start: 2024-05-01 | End: 2025-05-24

## 2024-08-07 ENCOUNTER — OFFICE VISIT (OUTPATIENT)
Dept: URBAN - NONMETROPOLITAN AREA CLINIC 13 | Facility: CLINIC | Age: 79
End: 2024-08-07
Payer: MEDICARE

## 2024-08-07 VITALS
WEIGHT: 166 LBS | BODY MASS INDEX: 29.41 KG/M2 | HEIGHT: 63 IN | HEART RATE: 72 BPM | SYSTOLIC BLOOD PRESSURE: 134 MMHG | DIASTOLIC BLOOD PRESSURE: 87 MMHG

## 2024-08-07 DIAGNOSIS — K44.9 HIATAL HERNIA: ICD-10-CM

## 2024-08-07 DIAGNOSIS — Z12.11 COLON CANCER SCREENING: ICD-10-CM

## 2024-08-07 DIAGNOSIS — K21.9 GERD (GASTROESOPHAGEAL REFLUX DISEASE): ICD-10-CM

## 2024-08-07 PROCEDURE — 99213 OFFICE O/P EST LOW 20 MIN: CPT | Performed by: NURSE PRACTITIONER

## 2024-08-07 RX ORDER — LANSOPRAZOLE 30 MG/1
1 CAPSULE BEFORE A MEAL CAPSULE, DELAYED RELEASE ORAL ONCE A DAY
Status: ON HOLD | COMMUNITY

## 2024-08-07 RX ORDER — SUCRALFATE 1 G/1
1 TABLET DISSOLVED IN A TABLESPOON OF WATER ON AN EMPTY STOMACH TABLET ORAL TWICE A DAY
Qty: 60 TABLET | Refills: 11 | Status: ACTIVE | COMMUNITY

## 2024-08-07 RX ORDER — VONOPRAZAN FUMARATE 26.72 MG/1
1 TABLET TABLET ORAL DAILY
Qty: 90 TABLET | Refills: 3 | Status: ACTIVE | COMMUNITY
Start: 2024-05-01 | End: 2025-05-24

## 2024-08-07 RX ORDER — IBUPROFEN 200 MG/1
CAPSULE, LIQUID FILLED ORAL
COMMUNITY

## 2024-08-07 RX ORDER — SUCRALFATE 1 G/1
1 TABLET DISSOLVED IN A TABLESPOON OF WATER ON AN EMPTY STOMACH TABLET ORAL TWICE A DAY
Qty: 60 TABLET | Refills: 11 | OUTPATIENT

## 2024-08-07 RX ORDER — OMEPRAZOLE 40 MG/1
1 CAPSULE 30 MINUTES BEFORE MORNING MEAL CAPSULE, DELAYED RELEASE ORAL ONCE A DAY
Qty: 90 | Refills: 3 | COMMUNITY
Start: 2020-06-23

## 2024-08-07 RX ORDER — LOSARTAN POTASSIUM 50 MG/1
TABLET, FILM COATED ORAL
COMMUNITY

## 2024-08-07 RX ORDER — VONOPRAZAN FUMARATE 26.72 MG/1
1 TABLET TABLET ORAL DAILY
Qty: 90 TABLET | Refills: 3 | OUTPATIENT

## 2024-08-07 RX ORDER — SPIRONOLACTONE 50 MG/1
1 TABLET TABLET, FILM COATED ORAL ONCE A DAY
COMMUNITY

## 2024-08-07 RX ORDER — FAMOTIDINE 40 MG/1
1 TABLET 30MINS PRIOR TO MEALS TABLET, FILM COATED ORAL BID
Qty: 180 TABLET | Refills: 3 | COMMUNITY

## 2024-08-07 NOTE — HPI-OTHER HISTORIES
7/2/2019 Ms. Kayce Moffett is a 74 year old female here for reflux. She has had reflux for years. It has gotten worse over the last 18 months. She thinks she may have had an ulcer at one time. She has never had an EGD. She has tried omerpazole with no change- she can not remember how long she took it. She had been taking zantac 150mg but she stopped this because she did not feel it helped. She is currently taking Mint Rolaids. These help the most. Her symptoms are worse when she lays down at night. She denies any blood in her stool or melena. She has had a loss of appetite. CS  9/24 Kayce returns after EGD that showed hiatal hernia and moderate esophagitis. She currently is on  only rolaids. she did not take my advice to be on PPI. She is still having gerd and nighttime heartburn. She is not pleased with her progresso on rolaids.  11/19/2019 Ms. Devries is here for f.u of esophagitis. She was started on protonix 40mg BID at her last OV. Today, she is feeling great. Her reflux is now well controlled. She has woke a few times with acid in her throat. She has been taking the protonix at bedtime not with a meal. CS   2/25/2020 Ms. Moffett is here for f/u of GERD. She was decreased from BID protonix to daily. She denies any breakthrough and her reflux she feels is well controlled. She is however having some chest pain and a rash. She is seeing cardiology and dermatology soon. She is very concerned about her symptoms. CS   6/23/2020 Ms. Moffett is evaluated via telehealth for f/u of GERD. At her last OV, she was feeling better and was able to wean to daily protonix. She was having some chest pain and a rash and was seeing cardiology and derm. We were concerned the chest pain was coming from reflux and spasm. Her protonix was increased back to BID and levsin was added prn. She saw cardiology and it was felt her chest pain was reflux related. She saw derm and there was a concern that protonix was the cause. She started taking the protonix everyother day and the rash got slightly better. She stopped it for the last week and has noticed a big improvement in her rash. She is having some reflux- rolaids do help. She had a cologuard that was negative with her PCP recently. CS   7/21/2020 Ms. Moffett is evaluated via telehealth for f/u of GERD. At her last OV, she was having a rash she thought was related to the protonix. Her protonix was stopped and changed to omerpazole 40mg daily. Her rash got worse. She has stopped the omerpazole as well. She called her PCP and was told omeprazole and protonix were on her allergy list- we did not have this documented nor did her pharmacy.  Her reflux is not well controlled. Rolaids help. She denies any further chest pain. She has not needed the levsin. CS   9/2/2020 Ms. Moffett is here for f/u of GERD. She has stopped all PPI and her rash is better. She is taking pepcid 20mg in the evening and rolaids as needed. Her reflux is well controlled. She has started eating smaller portions and low fat foods. Overall, she is feeling well today. CS   3/3/2021 Ms. Moffett is here for f/u of GERD. She stopped all PPI and her rash got better. Since changing her diet, her reflux is fairly well controlled. She is was given 40mg of pepcid 1-2 times a day. She developed facial hair and wondered if the pepcid caused this. She has stopped this and now on aldactone 50mg daily. She is taking OTC pepcid 20mg and this works well.  She had some chest fluttering coming to her appointment today. She was anxious and running late. She has not had a recent EKG. Her parents had heart disease. CS  3/15/2022 Ms. Moffett is here for f/u of GERD. She had a rash with PPIs and pepcid messes with her head. She had been taking baking soda for her reflux this was working until the day after Millie. She started having pain mid epigastric to her LUQ. She went to urgent care. There was a concern about a kidney stone so she had a CT scan. This showed a small hiatal hernia. She would like this fixed. She had this hiatal hernia on EGD in 2019. She has been trying to watch her diet. Her PCP gave her prevacid which has given her a rash and diarrhea. It does help however. CS  5/10/2023 Ms. Moffett is here for f/u of GERD. She had a rash with PPIs and pepcid messes with her head. Since her last OV, she had another flare of reflux and Dr Haile started her on prevacid. This caused vertigo. She is now back taking milk with baking soda prior to supper and rolaids prn. She can not recall if carafate helped. She is not taking it. She saw the nutritionist and watching her diet. She is not eating late. She has lost 15 pounds. Overall, she is doing ok. CS  5/1/2024 Ms. Moffett is here for reflux. She is taking carafate 1-2 times a day. This helps but she has a return of reflux. She had a rash with PPIs and pepcid messes with her head. She admits to not being as careful with her diet. CS

## 2024-08-07 NOTE — HPI-TODAY'S VISIT:
8/7/2024 Ms. Moffett is here for fu of reflux. She is taking voquezna half a pill every 3 days. This is working well. She has not had any reflux. She has not needed the carafate.  She is very happy with her GI symptoms. The voquezna is expensive and hoping it continues to work. CS

## 2025-02-05 ENCOUNTER — OFFICE VISIT (OUTPATIENT)
Dept: URBAN - NONMETROPOLITAN AREA CLINIC 13 | Facility: CLINIC | Age: 80
End: 2025-02-05
Payer: MEDICARE

## 2025-02-05 VITALS
DIASTOLIC BLOOD PRESSURE: 87 MMHG | BODY MASS INDEX: 28.7 KG/M2 | HEART RATE: 81 BPM | WEIGHT: 162 LBS | HEIGHT: 63 IN | SYSTOLIC BLOOD PRESSURE: 137 MMHG

## 2025-02-05 DIAGNOSIS — Z12.11 COLON CANCER SCREENING: ICD-10-CM

## 2025-02-05 DIAGNOSIS — K44.9 HIATAL HERNIA: ICD-10-CM

## 2025-02-05 DIAGNOSIS — K21.9 GERD (GASTROESOPHAGEAL REFLUX DISEASE): ICD-10-CM

## 2025-02-05 PROCEDURE — 99213 OFFICE O/P EST LOW 20 MIN: CPT | Performed by: NURSE PRACTITIONER

## 2025-02-05 RX ORDER — IBUPROFEN 200 MG/1
CAPSULE, LIQUID FILLED ORAL
COMMUNITY

## 2025-02-05 RX ORDER — LANSOPRAZOLE 30 MG/1
1 CAPSULE BEFORE A MEAL CAPSULE, DELAYED RELEASE ORAL ONCE A DAY
Status: ON HOLD | COMMUNITY

## 2025-02-05 RX ORDER — SUCRALFATE 1 G/1
1 TABLET DISSOLVED IN A TABLESPOON OF WATER ON AN EMPTY STOMACH TABLET ORAL TWICE A DAY
Qty: 60 TABLET | Refills: 11 | OUTPATIENT

## 2025-02-05 RX ORDER — LOSARTAN POTASSIUM 50 MG/1
TABLET, FILM COATED ORAL
COMMUNITY

## 2025-02-05 RX ORDER — VONOPRAZAN FUMARATE 26.72 MG/1
1 TABLET TABLET ORAL DAILY
Qty: 90 TABLET | Refills: 3 | Status: ACTIVE | COMMUNITY

## 2025-02-05 RX ORDER — VONOPRAZAN FUMARATE 26.72 MG/1
1 TABLET TABLET ORAL DAILY
Qty: 90 TABLET | Refills: 3 | OUTPATIENT

## 2025-02-05 RX ORDER — FAMOTIDINE 40 MG/1
1 TABLET 30MINS PRIOR TO MEALS TABLET, FILM COATED ORAL BID
Qty: 180 TABLET | Refills: 3 | COMMUNITY

## 2025-02-05 RX ORDER — OMEPRAZOLE 40 MG/1
1 CAPSULE 30 MINUTES BEFORE MORNING MEAL CAPSULE, DELAYED RELEASE ORAL ONCE A DAY
Qty: 90 | Refills: 3 | COMMUNITY
Start: 2020-06-23

## 2025-02-05 RX ORDER — SPIRONOLACTONE 50 MG/1
1 TABLET TABLET, FILM COATED ORAL ONCE A DAY
COMMUNITY

## 2025-02-05 RX ORDER — SUCRALFATE 1 G/1
1 TABLET DISSOLVED IN A TABLESPOON OF WATER ON AN EMPTY STOMACH TABLET ORAL TWICE A DAY
Qty: 60 TABLET | Refills: 11 | Status: ACTIVE | COMMUNITY

## 2025-02-05 NOTE — HPI-TODAY'S VISIT:
2/5/2025 Ms. Moffett is here for fu of reflux. She continues to do well. She is taking voquezna half a pill every 3-5 days. She will take carafate every couple of nights. She has been really happy with reflux. She has not had any SE. CS

## 2025-02-05 NOTE — HPI-OTHER HISTORIES
7/2/2019 Ms. Kayce Moffett is a 74 year old female here for reflux. She has had reflux for years. It has gotten worse over the last 18 months. She thinks she may have had an ulcer at one time. She has never had an EGD. She has tried omerpazole with no change- she can not remember how long she took it. She had been taking zantac 150mg but she stopped this because she did not feel it helped. She is currently taking Mint Rolaids. These help the most. Her symptoms are worse when she lays down at night. She denies any blood in her stool or melena. She has had a loss of appetite. CS  9/24 Kayce returns after EGD that showed hiatal hernia and moderate esophagitis. She currently is on  only rolaids. she did not take my advice to be on PPI. She is still having gerd and nighttime heartburn. She is not pleased with her progresso on rolaids.  11/19/2019 Ms. Devries is here for f.u of esophagitis. She was started on protonix 40mg BID at her last OV. Today, she is feeling great. Her reflux is now well controlled. She has woke a few times with acid in her throat. She has been taking the protonix at bedtime not with a meal. CS   2/25/2020 Ms. Moffett is here for f/u of GERD. She was decreased from BID protonix to daily. She denies any breakthrough and her reflux she feels is well controlled. She is however having some chest pain and a rash. She is seeing cardiology and dermatology soon. She is very concerned about her symptoms. CS   6/23/2020 Ms. Moffett is evaluated via telehealth for f/u of GERD. At her last OV, she was feeling better and was able to wean to daily protonix. She was having some chest pain and a rash and was seeing cardiology and derm. We were concerned the chest pain was coming from reflux and spasm. Her protonix was increased back to BID and levsin was added prn. She saw cardiology and it was felt her chest pain was reflux related. She saw derm and there was a concern that protonix was the cause. She started taking the protonix everyother day and the rash got slightly better. She stopped it for the last week and has noticed a big improvement in her rash. She is having some reflux- rolaids do help. She had a cologuard that was negative with her PCP recently. CS   7/21/2020 Ms. Moffett is evaluated via telehealth for f/u of GERD. At her last OV, she was having a rash she thought was related to the protonix. Her protonix was stopped and changed to omerpazole 40mg daily. Her rash got worse. She has stopped the omerpazole as well. She called her PCP and was told omeprazole and protonix were on her allergy list- we did not have this documented nor did her pharmacy.  Her reflux is not well controlled. Rolaids help. She denies any further chest pain. She has not needed the levsin. CS   9/2/2020 Ms. Moffett is here for f/u of GERD. She has stopped all PPI and her rash is better. She is taking pepcid 20mg in the evening and rolaids as needed. Her reflux is well controlled. She has started eating smaller portions and low fat foods. Overall, she is feeling well today. CS   3/3/2021 Ms. Moffett is here for f/u of GERD. She stopped all PPI and her rash got better. Since changing her diet, her reflux is fairly well controlled. She is was given 40mg of pepcid 1-2 times a day. She developed facial hair and wondered if the pepcid caused this. She has stopped this and now on aldactone 50mg daily. She is taking OTC pepcid 20mg and this works well.  She had some chest fluttering coming to her appointment today. She was anxious and running late. She has not had a recent EKG. Her parents had heart disease. CS  3/15/2022 Ms. Moffett is here for f/u of GERD. She had a rash with PPIs and pepcid messes with her head. She had been taking baking soda for her reflux this was working until the day after Millie. She started having pain mid epigastric to her LUQ. She went to urgent care. There was a concern about a kidney stone so she had a CT scan. This showed a small hiatal hernia. She would like this fixed. She had this hiatal hernia on EGD in 2019. She has been trying to watch her diet. Her PCP gave her prevacid which has given her a rash and diarrhea. It does help however. CS  5/10/2023 Ms. Moffett is here for f/u of GERD. She had a rash with PPIs and pepcid messes with her head. Since her last OV, she had another flare of reflux and Dr Haile started her on prevacid. This caused vertigo. She is now back taking milk with baking soda prior to supper and rolaids prn. She can not recall if carafate helped. She is not taking it. She saw the nutritionist and watching her diet. She is not eating late. She has lost 15 pounds. Overall, she is doing ok. CS  5/1/2024 Ms. Moffett is here for reflux. She is taking carafate 1-2 times a day. This helps but she has a return of reflux. She had a rash with PPIs and pepcid messes with her head. She admits to not being as careful with her diet. CS  8/7/2024 Ms. Moffett is here for fu of reflux. She is taking voquezna half a pill every 3 days. This is working well. She has not had any reflux. She has not needed the carafate.  She is very happy with her GI symptoms. The voquezna is expensive and hoping it continues to work. CS

## 2025-02-13 ENCOUNTER — TELEPHONE ENCOUNTER (OUTPATIENT)
Dept: URBAN - NONMETROPOLITAN AREA CLINIC 2 | Facility: CLINIC | Age: 80
End: 2025-02-13

## 2025-02-13 RX ORDER — HYDROCORTISONE ACETATE PRAMOXINE HCL 2.5; 1 G/100G; G/100G
1 APPLICATION CREAM TOPICAL THREE TIMES A DAY
Qty: 1 | Refills: 1 | OUTPATIENT
Start: 2025-02-13 | End: 2025-03-13

## 2025-08-13 ENCOUNTER — OFFICE VISIT (OUTPATIENT)
Dept: URBAN - NONMETROPOLITAN AREA CLINIC 13 | Facility: CLINIC | Age: 80
End: 2025-08-13
Payer: MEDICARE

## 2025-08-13 DIAGNOSIS — Z12.11 COLON CANCER SCREENING: ICD-10-CM

## 2025-08-13 DIAGNOSIS — K62.5 BRBPR (BRIGHT RED BLOOD PER RECTUM): ICD-10-CM

## 2025-08-13 DIAGNOSIS — K21.9 GERD (GASTROESOPHAGEAL REFLUX DISEASE): ICD-10-CM

## 2025-08-13 DIAGNOSIS — K44.9 HIATAL HERNIA: ICD-10-CM

## 2025-08-13 PROCEDURE — 99213 OFFICE O/P EST LOW 20 MIN: CPT | Performed by: NURSE PRACTITIONER

## 2025-08-13 RX ORDER — SUCRALFATE 1 G/1
1 TABLET DISSOLVED IN A TABLESPOON OF WATER ON AN EMPTY STOMACH TABLET ORAL TWICE A DAY
Qty: 60 TABLET | Refills: 11 | OUTPATIENT
Start: 2025-08-13

## 2025-08-13 RX ORDER — OMEPRAZOLE 40 MG/1
1 CAPSULE 30 MINUTES BEFORE MORNING MEAL CAPSULE, DELAYED RELEASE ORAL ONCE A DAY
Qty: 90 | Refills: 3 | COMMUNITY
Start: 2020-06-23

## 2025-08-13 RX ORDER — LOSARTAN POTASSIUM 50 MG/1
TABLET, FILM COATED ORAL
Status: ACTIVE | COMMUNITY

## 2025-08-13 RX ORDER — FAMOTIDINE 40 MG/1
1 TABLET 30MINS PRIOR TO MEALS TABLET, FILM COATED ORAL BID
Qty: 180 TABLET | Refills: 3 | Status: ON HOLD | COMMUNITY

## 2025-08-13 RX ORDER — LANSOPRAZOLE 30 MG/1
1 CAPSULE BEFORE A MEAL CAPSULE, DELAYED RELEASE ORAL ONCE A DAY
Status: ON HOLD | COMMUNITY

## 2025-08-13 RX ORDER — VONOPRAZAN FUMARATE 26.72 MG/1
1 TABLET TABLET ORAL DAILY
Qty: 90 TABLET | Refills: 3 | OUTPATIENT
Start: 2025-08-13 | End: 2026-08-08

## 2025-08-13 RX ORDER — SPIRONOLACTONE 50 MG/1
1 TABLET TABLET, FILM COATED ORAL ONCE A DAY
Status: ON HOLD | COMMUNITY

## 2025-08-13 RX ORDER — VONOPRAZAN FUMARATE 26.72 MG/1
1 TABLET TABLET ORAL DAILY
Qty: 90 TABLET | Refills: 3 | Status: ACTIVE | COMMUNITY

## 2025-08-13 RX ORDER — SUCRALFATE 1 G/1
1 TABLET DISSOLVED IN A TABLESPOON OF WATER ON AN EMPTY STOMACH TABLET ORAL TWICE A DAY
Qty: 60 TABLET | Refills: 11 | Status: ON HOLD | COMMUNITY

## 2025-08-13 RX ORDER — IBUPROFEN 200 MG/1
CAPSULE, LIQUID FILLED ORAL
Status: ACTIVE | COMMUNITY